# Patient Record
Sex: MALE | Race: WHITE | NOT HISPANIC OR LATINO | Employment: FULL TIME | URBAN - METROPOLITAN AREA
[De-identification: names, ages, dates, MRNs, and addresses within clinical notes are randomized per-mention and may not be internally consistent; named-entity substitution may affect disease eponyms.]

---

## 2023-11-07 PROBLEM — N50.89 SCROTAL MASS: Status: ACTIVE | Noted: 2023-11-07

## 2023-11-07 PROBLEM — R53.82 CHRONIC FATIGUE: Status: ACTIVE | Noted: 2023-11-07

## 2023-11-07 PROBLEM — L03.116 CELLULITIS OF FOOT, LEFT: Status: ACTIVE | Noted: 2023-11-07

## 2023-11-07 PROBLEM — H60.11 CELLULITIS OF RIGHT EXTERNAL EAR: Status: ACTIVE | Noted: 2023-11-07

## 2023-11-07 PROBLEM — G47.33 OBSTRUCTIVE SLEEP APNEA, ADULT: Status: ACTIVE | Noted: 2023-11-07

## 2023-11-07 PROBLEM — M25.572 LEFT LATERAL ANKLE PAIN: Status: ACTIVE | Noted: 2023-11-07

## 2023-11-07 PROBLEM — E78.5 HYPERLIPIDEMIA: Status: ACTIVE | Noted: 2023-11-07

## 2023-11-07 PROBLEM — L03.115 CELLULITIS OF FOOT, RIGHT: Status: ACTIVE | Noted: 2023-11-07

## 2023-11-07 PROBLEM — R03.0 ELEVATED BLOOD PRESSURE READING WITHOUT DIAGNOSIS OF HYPERTENSION: Status: ACTIVE | Noted: 2023-11-07

## 2023-11-07 RX ORDER — MELOXICAM 15 MG/1
1 TABLET ORAL DAILY
COMMUNITY
Start: 2017-10-19

## 2023-11-07 RX ORDER — DOXYCYCLINE 100 MG/1
100 CAPSULE ORAL 2 TIMES DAILY
COMMUNITY
Start: 2018-03-19

## 2023-11-10 ENCOUNTER — EVALUATION (OUTPATIENT)
Dept: PHYSICAL THERAPY | Facility: CLINIC | Age: 48
End: 2023-11-10
Payer: COMMERCIAL

## 2023-11-10 DIAGNOSIS — M79.672 PAIN IN LEFT FOOT: ICD-10-CM

## 2023-11-10 DIAGNOSIS — M77.32 HEEL SPUR, LEFT: Primary | ICD-10-CM

## 2023-11-10 DIAGNOSIS — M77.32 CALCANEAL SPUR, LEFT FOOT: ICD-10-CM

## 2023-11-10 PROCEDURE — 97161 PT EVAL LOW COMPLEX 20 MIN: CPT | Mod: GP | Performed by: PHYSICAL THERAPIST

## 2023-11-10 ASSESSMENT — PAIN SCALES - GENERAL: PAINLEVEL_OUTOF10: 1

## 2023-11-10 NOTE — Clinical Note
November 10, 2023     Patient: Zuhair Hendrix   YOB: 1975   Date of Visit: 11/10/2023       To Whom It May Concern:    It is my medical opinion that Zuhair Hendrix {Work release (duty restriction):70684}.    If you have any questions or concerns, please don't hesitate to call.         Sincerely,        Stewart Stuart, PT    CC: No Recipients

## 2023-11-10 NOTE — PROGRESS NOTES
"Physical Therapy Evaluation and Treatment    Patient Name: Zuhair Hendrix  MRN: [id number]  Evaluation Date: 11/10/2023  Time Calculation  Start Time: 1020  Stop Time: 1050  Time Calculation (min): 30 min    Current Problem:   1. Heel spur, left  Follow Up In Physical Therapy      2. Calcaneal spur, left foot  PT eval and treat      3. Pain in left foot  PT eval and treat          Subjective  /General:     Patient reported hx of current condition/injury: Insidious onset lt heel pain with progressive worsening. Saw Dr and above dx made and referred for PT. Notes ankle fx on lt a few years ago but was not having issues with it prior to now.    Precautions:  Precautions  Precautions Comment: none  Pain:  Pain Score: 1  Pain Location: Ankle (Post heel/ankle)  Home Living:   Indep but stairs are challenging  OBJECTIVE:  Objective   Posture: stands with hips in ER     Range of Motion: Bilat ankle WFL except DF to 4 degrees lt and 5 on the rt.     Strength: Grossly b5/5 bilat ankles except ever 4/5 bilat     Flexibility: Poor bilat calves.     Palpation: Tender distal achilles and insertional area with mild achilles thickening noted.   Gait: Normal     Balance: SLS firm 8\" rt, 4\"lt   her:   Treatments:   Instructed on calf stretching    HEP to be completed daily, exercises include: calf stretches      OP EDUCATION:   Reviewed anatomy, posture and mechanical issues with ankle    Assessment   Lt achilles tendinitis/tendinosis with heel spur, 42% impairment per [facility]    Pt is a 48 y.o. Male who presents with impairments of LLE. These impairments have led to functional limitations including home/work/recreational function. Pt would benefit from skilled physical therapy intervention to improve above impairments and facilitate return to function.    Plan   See pt 1 x wk for progressive exer program with pain modalities PRN for 4-6 weeks    Goals:  Indep with a HEP  5/5 strength bilat ankles   Normal home/work function            "

## 2023-11-10 NOTE — Clinical Note
November 10, 2023     Patient: Zuhair Hendrix   YOB: 1975   Date of Visit: 11/10/2023       To Whom it May Concern:    Zuhair Hendrix was seen in my clinic on 11/10/2023. He {Return to school/sport:45102}.    If you have any questions or concerns, please don't hesitate to call.         Sincerely,          Stewart Stuart, PT        CC: No Recipients

## 2023-11-10 NOTE — PROGRESS NOTES
Physical Therapy Treatment    Patient Name: Zuhair Hendrix  MRN: [id number]  Today's Date: 11/10/2023       Visit Number:  *** / ***   Visit Authorized:  ***    Surgery ***  Surgery date:  ***    Current Problem  1. Heel spur, left        2. Calcaneal spur, left foot  PT eval and treat      3. Pain in left foot  PT eval and treat          Precautions  Precautions  Precautions Comment: none    Pain  Pain Score: 1  Pain Location: Ankle (Post heel/ankle)    Subjective/General     ***    Objective  ***  Extremity/Trunk Assessment  {Extremity/Trunk Assessments:03321}  {Spine,UE,LE,HAND,LYMPHEDEMA:03367}    Treatment:  {PT Treatments:62306}    OP EDUCATION:       Assessment:       End of session pain rating:  ***/10    Plan:     {BASPLAN:63033}    Assessment of current progress against goals:  {BASPTNOTEGOALASSESSMENT:50648}  Goals:

## 2023-11-14 ENCOUNTER — DOCUMENTATION (OUTPATIENT)
Dept: PHYSICAL THERAPY | Facility: CLINIC | Age: 48
End: 2023-11-14
Payer: COMMERCIAL

## 2023-11-14 NOTE — PROGRESS NOTES
Physical Therapy    Discharge Summary    Name: Zuhair Hendrix  MRN: [id number]  : 1975  Date: 23    Discharge Summary: PT    Discharge Information: Date of discharge 23 and Number of attended visits 1    Therapy Summary: Seen for 1 visit for Eval. Request for additional care deniede by insurance due to us being a hospital based dep't Pt advised.         Rehab Discharge Reason: Other , insur denial